# Patient Record
Sex: FEMALE | Race: WHITE | Employment: PART TIME | URBAN - METROPOLITAN AREA
[De-identification: names, ages, dates, MRNs, and addresses within clinical notes are randomized per-mention and may not be internally consistent; named-entity substitution may affect disease eponyms.]

---

## 2019-12-11 ENCOUNTER — THERAPY VISIT (OUTPATIENT)
Dept: PHYSICAL THERAPY | Facility: CLINIC | Age: 59
End: 2019-12-11
Payer: COMMERCIAL

## 2019-12-11 DIAGNOSIS — G89.29 CHRONIC LEFT SHOULDER PAIN: ICD-10-CM

## 2019-12-11 DIAGNOSIS — M25.512 CHRONIC LEFT SHOULDER PAIN: ICD-10-CM

## 2019-12-11 PROCEDURE — 97110 THERAPEUTIC EXERCISES: CPT | Mod: GP | Performed by: PHYSICAL THERAPIST

## 2019-12-11 PROCEDURE — 97112 NEUROMUSCULAR REEDUCATION: CPT | Mod: GP | Performed by: PHYSICAL THERAPIST

## 2019-12-11 PROCEDURE — 97161 PT EVAL LOW COMPLEX 20 MIN: CPT | Mod: GP | Performed by: PHYSICAL THERAPIST

## 2019-12-11 NOTE — LETTER
[facility]  [address]  [place] [address]  [phone]    2019    Re: Lynn Zepeda   :   1960  MRN:  [id number]   REFERRING PHYSICIAN:   Estefania Gilmore    [facility]    Date of Initial Evaluation:  2019  Visits:  Rxs Used: 1  Reason for Referral:  Chronic left shoulder pain    EVALUATION SUMMARY    Subjective:  The history is provided by the patient. No  was used.   Type of problem:  Left shoulder  Condition occurred with:  A fall. This is a chronic condition   Problem details: I was ice skating and falling on the right arm--I did it three time that day and landed on my left arm.  I had more of a wrist injury.  Later when I had to do some stretching in May and  I noticed I had some limitation of reaching and painful..   Patient reports pain:  Anterior, lateral and posterior. Radiates to:  Shoulder and upper arm. Associated with: none. Symptoms are exacerbated by using arm at shoulder level, using arm overhead, using arm behind back, carrying and lifting (reaching, 1day could barely move my arm ) and relieved by rest (tylenol).  Lynn Zepeda being seen for left shoulder pain.   Problem began 10/28/2019 (MD appointment). Where condition occurred: during recreation / sport.Problem occurred: unknown , possibly from falling on the ice while skating  and reported as 7/10 (with activity) on pain scale. General health as reported by patient is fair. Pertinent medical history includes:  Cancer, asthma, depression, menopausal, numbness/tingling and overweight. Other medical history details: mid back pain, ulcerative colitis, autoimmune liver disease.    Surgeries include:  Cancer surgery. Other surgery history details: removal of large instsetine, anus and rectum.  Current medications:  Anti-depressants. Other medications details:  medication for liver disease, antibotics, athma, .   Primary job tasks include:  Prolonged standing, repetitive tasks, operating a machine/assembly, pushing/pulling and lifting/carrying.  Pain is described as sharp and is intermittent. Pain is the same all the time.  Imaging testing: none. Previous treatment includes physical therapy (thumb, for other reason).    Patient is bakery,  pricing groceries, receiving. Restrictions include:  Working in normal job without restrictions (careful).  Home/work barriers: house, granddaughter 9.  Red flags:  None as reported by patient.                  Objective:  Standing Alignment:    Cervical/Thoracic:  Forward head (fair sitting posture)  Shoulder/UE:  Rounded shoulders (internal rotation of bilateral shoulders)  Re: Lynn Zepeda   :   1960      Lumbar:  Lordosis incr  Flexibility/Screens:   Positive screens:  ShoulderNegative screens: Cervical   Upper Extremity:    Decreased left upper extremity flexibility at:  Pectoralis Major and Pectoralis Minor  Decreased right upper extremity flexibility present at:  Pectoralis Major and Pectoralis Minor       Cervical/Thoracic Evaluation  Arom wnl cervical: cervical rotation tightness. flexion, extension WFL           Shoulder Evaluation:  ROM:  AROM:    Flexion:  Left:  WFL, painful arc, end range    Right:  WFL  Abduction:  Left: WFL, pain at end range   Right:  WFL  External Rotation:  Left:  80      Flexion/External Rotation:  Left:  Neck    Right:  Neck   Extension/Internal Rotation:  Left:  Lumbar with pain     Right:  Upper lumbar    Pain: opposite shoulder right WFL, left tightness   Endfeel: upper trapezius left,  4/5  right 4+/5, middle left 4-/5, right 4,4+/5   Stability Testing:    Left shoulder stability negative testing:  Sulcus sign  Right shoulder stability negative testing:  Sulcus sign  Special Tests:    Left shoulder positive for the following special tests:  Impingement and Acromioclavicular  Right  shoulder negative for the following special tests:Impingement  Palpation:    Left shoulder tenderness present at:  Acrimioclavicular; Supraspinatus; Subscapularis; Levator and Rhomboids  Mobility Tests:    Glenohumeral posterior left:  Hypomobile    Glenohumeral inferior left:  Hypomobile    Scapulothoracic left:  Hypermobile    Scapulohumeral rhythm right:  Hypermobile           Assessment/Plan:    Patient is a 59 year old female with left side shoulder complaints.    Patient has the following significant findings with corresponding treatment plan.                Diagnosis 1:  Left shoulder pain   Pain -  manual therapy, self management, education and home program  Decreased joint mobility - manual therapy, therapeutic exercise, therapeutic activity and home program  Decreased strength - therapeutic exercise, therapeutic activities and home program  Impaired muscle performance - neuro re-education and home program  Decreased function - therapeutic activities and home program  Impaired posture - neuro re-education, therapeutic activities and home program    Therapy Evaluation Codes:   Re: Lynn Zepeda   :   1960    Cumulative Therapy Evaluation is: Low complexity.    Previous and current functional limitations:  (See Goal Flow Sheet for this information)    Short term and Long term goals: (See Goal Flow Sheet for this information)     Communication ability:  Patient appears to be able to clearly communicate and understand verbal and written communication and follow directions correctly.  Treatment Explanation - The following has been discussed with the patient:   RX ordered/plan of care  This patient would benefit from PT intervention to resume normal activities.   Rehab potential is good.    Frequency:  1 X week, once daily  Duration:  for 8 weeks  Discharge Plan:  Achieve all LTG.  Independent in home treatment program.    Thank you for your referral.    INQUIRIES  Therapist: Keeley Nunez    [facility]  [address] [address]  Phone: [phone]  Fax: [phone]

## 2019-12-11 NOTE — PROGRESS NOTES
[facility] Initial Evaluation  Subjective:  The history is provided by the patient. No  was used.   Type of problem:  Left shoulder   Condition occurred with:  A fall. This is a chronic condition   Problem details: I was ice skating and falling on the right arm--I did it three time that day and landed on my left arm.  I had more of a wrist injury.  Later when I had to do some stretching in May and June I noticed I had some limitation of reaching and painful..   Patient reports pain:  Anterior, lateral and posterior. Radiates to:  Shoulder and upper arm. Associated with: none. Symptoms are exacerbated by using arm at shoulder level, using arm overhead, using arm behind back, carrying and lifting (reaching, 1day could barely move my arm ) and relieved by rest (tylenol).    Lynn HASMUKH Zepeda being seen for left shoulder pain.   Problem began 10/28/2019 (MD appointment). Where condition occurred: during recreation / sport.Problem occurred: unknown , possibly from falling on the ice while skating  and reported as 7/10 (with activity) on pain scale. General health as reported by patient is fair. Pertinent medical history includes:  Cancer, asthma, depression, menopausal, numbness/tingling and overweight. Other medical history details: mid back pain, ulcerative colitis, autoimmune liver disease.    Surgeries include:  Cancer surgery. Other surgery history details: removal of large instsetine, anus and rectum.  Current medications:  Anti-depressants. Other medications details: medication for liver disease, antibotics, athma, .   Primary job tasks include:  Prolonged standing, repetitive tasks, operating a machine/assembly, pushing/pulling and lifting/carrying.  Pain is described as sharp and is intermittent. Pain is the same all the time.  Imaging testing: none. Previous treatment includes physical therapy (thumb, for other reason).    Patient is bakery,  pricing groceries, receiving.  Restrictions include:  Working in normal job without restrictions (careful).    Home/work barriers: house, granddaughter 9.  Red flags:  None as reported by patient.                      Objective:  Standing Alignment:    Cervical/Thoracic:  Forward head (fair sitting posture)  Shoulder/UE:  Rounded shoulders (internal rotation of bilateral shoulders)  Lumbar:  Lordosis incr                Flexibility/Screens:   Positive screens:  ShoulderNegative screens: Cervical   Upper Extremity:    Decreased left upper extremity flexibility at:  Pectoralis Major and Pectoralis Minor    Decreased right upper extremity flexibility present at:  Pectoralis Major and Pectoralis Minor                      Cervical/Thoracic Evaluation  Arom wnl cervical: cervical rotation tightness. flexion, extension WFL                                   Shoulder Evaluation:  ROM:  AROM:    Flexion:  Left:  WFL, painful arc, end range    Right:  WFL    Abduction:  Left: WFL, pain at end range   Right:  WFL      External Rotation:  Left:  80              Flexion/External Rotation:  Left:  Neck    Right:  Neck   Extension/Internal Rotation:  Left:  Lumbar with pain     Right:  Upper lumbar      Pain: opposite shoulder right WFL, left tightness   Endfeel: upper trapezius left,  4/5  right 4+/5, middle left 4-/5, right 4,4+/5     Stability Testing:      Left shoulder stability negative testing:  Sulcus sign    Right shoulder stability negative testing:  Sulcus sign  Special Tests:    Left shoulder positive for the following special tests:  Impingement and Acromioclavicular    Right shoulder negative for the following special tests:Impingement  Palpation:    Left shoulder tenderness present at:  Acrimioclavicular; Supraspinatus; Subscapularis; Levator and Rhomboids    Mobility Tests:      Glenohumeral posterior left:  Hypomobile    Glenohumeral inferior left:  Hypomobile      Scapulothoracic left:  Hypermobile      Scapulohumeral rhythm right:   Hypermobile                                     General     ROS    Assessment/Plan:    Patient is a 59 year old female with left side shoulder complaints.    Patient has the following significant findings with corresponding treatment plan.                Diagnosis 1:  Left shoulder pain   Pain -  manual therapy, self management, education and home program  Decreased joint mobility - manual therapy, therapeutic exercise, therapeutic activity and home program  Decreased strength - therapeutic exercise, therapeutic activities and home program  Impaired muscle performance - neuro re-education and home program  Decreased function - therapeutic activities and home program  Impaired posture - neuro re-education, therapeutic activities and home program    Therapy Evaluation Codes:   Cumulative Therapy Evaluation is: Low complexity.    Previous and current functional limitations:  (See Goal Flow Sheet for this information)    Short term and Long term goals: (See Goal Flow Sheet for this information)     Communication ability:  Patient appears to be able to clearly communicate and understand verbal and written communication and follow directions correctly.  Treatment Explanation - The following has been discussed with the patient:   RX ordered/plan of care  This patient would benefit from PT intervention to resume normal activities.   Rehab potential is good.    Frequency:  1 X week, once daily  Duration:  for 8 weeks  Discharge Plan:  Achieve all LTG.  Independent in home treatment program.    Please refer to the daily flowsheet for treatment today, total treatment time and time spent performing 1:1 timed codes.

## 2019-12-12 PROBLEM — M25.512 CHRONIC LEFT SHOULDER PAIN: Status: ACTIVE | Noted: 2019-12-12

## 2019-12-12 PROBLEM — G89.29 CHRONIC LEFT SHOULDER PAIN: Status: ACTIVE | Noted: 2019-12-12

## 2019-12-18 ENCOUNTER — THERAPY VISIT (OUTPATIENT)
Dept: PHYSICAL THERAPY | Facility: CLINIC | Age: 59
End: 2019-12-18
Payer: COMMERCIAL

## 2019-12-18 DIAGNOSIS — G89.29 CHRONIC LEFT SHOULDER PAIN: ICD-10-CM

## 2019-12-18 DIAGNOSIS — M25.512 CHRONIC LEFT SHOULDER PAIN: ICD-10-CM

## 2019-12-18 PROCEDURE — 97110 THERAPEUTIC EXERCISES: CPT | Mod: GP | Performed by: PHYSICAL THERAPIST

## 2019-12-18 PROCEDURE — 97112 NEUROMUSCULAR REEDUCATION: CPT | Mod: GP | Performed by: PHYSICAL THERAPIST

## 2019-12-18 PROCEDURE — 97140 MANUAL THERAPY 1/> REGIONS: CPT | Mod: GP | Performed by: PHYSICAL THERAPIST

## 2019-12-26 ENCOUNTER — THERAPY VISIT (OUTPATIENT)
Dept: PHYSICAL THERAPY | Facility: CLINIC | Age: 59
End: 2019-12-26
Payer: COMMERCIAL

## 2019-12-26 DIAGNOSIS — G89.29 CHRONIC LEFT SHOULDER PAIN: ICD-10-CM

## 2019-12-26 DIAGNOSIS — M25.512 CHRONIC LEFT SHOULDER PAIN: ICD-10-CM

## 2019-12-26 PROCEDURE — 97110 THERAPEUTIC EXERCISES: CPT | Mod: GP | Performed by: PHYSICAL THERAPIST

## 2019-12-26 PROCEDURE — 97140 MANUAL THERAPY 1/> REGIONS: CPT | Mod: GP | Performed by: PHYSICAL THERAPIST

## 2019-12-26 PROCEDURE — 97112 NEUROMUSCULAR REEDUCATION: CPT | Mod: GP | Performed by: PHYSICAL THERAPIST

## 2019-12-26 NOTE — LETTER
[facility]  [address]  [place] [address]  [phone]    2019    Re: Lynn Zepeda   :   1960  MRN:  [id number]   REFERRING PHYSICIAN:   Estefania Gilmore    [facility] PHYS[facility]    Date of Initial Evaluation:  2019  Visits:  Rxs Used: 3  Reason for Referral:  Chronic left shoulder pain    PROGRESS  REPORT  Progress reporting period is from 2019 to 2019.       SUBJECTIVE  Subjective changes noted by patient:   I still get the pain when I am in different and odd position.  Overall I had about 3 episodes only last for a few seconds.      Current Pain level: 0/10. With certain activities 3-4/10.     Previous pain level was  0/10 , with certain activities 3-4/10 .   Changes in function:  None  Adverse reaction to treatment or activity: activity - overhead reaching below an object is when the pain occurs    OBJECTIVE  Changes noted in objective findings:  Yes, AROM flexion, abduction end range, hand behind the back to lumbar with pain same pain as overhead.  Strength IR/ER 5/5, biceps triceps 5/5, adduction 4/5, flexion, abduction 5/5.  Poor scapular position and cueing to maintain correct position.  Tightness in UT, levator, pectoralis major, minor and GH.  Discussed with patient progression of exercises and home exercise program.      ASSESSMENT/PLAN  Updated problem list and treatment plan: Diagnosis 1:  Left shoulder pain  Pain -  manual therapy, self management, education and home program  Decreased ROM/flexibility - manual therapy, therapeutic exercise, therapeutic activity and home program  Decreased joint mobility - manual therapy, therapeutic exercise, therapeutic activity and home program  Decreased strength - therapeutic exercise, therapeutic activities and home program  Impaired muscle performance - neuro re-education and home  program  Decreased function - therapeutic activities and home program  STG/LTGs have been met or progress has been made towards goals:  None  Assessment of Progress: The patient's condition has potential to improve.  Self Management Plans:  Patient has been instructed in a home treatment program.  Patient  has been instructed in self management of symptoms.      Re: Lynn Zepeda   :   1960      Recommendations:  Since pain is so episodic, patient to try exercises on her own.  Discussed with patient progression of exercises and the need for correct scapular position.  Patient has appointments left on original order and may return if pain increases or if unable to progress with exercises.    Thank you for your referral.      INQUIRIES  Therapist: Keeley Nunez PT  [facility]  [address] [address]  Phone: [phone]  Fax: [phone]

## 2019-12-27 NOTE — PROGRESS NOTES
Subjective:  HPI                    Objective:  System    Physical Exam    General     ROS    Assessment/Plan:    PROGRESS  REPORT    Progress reporting period is from 12/11/2019  to 12/16/2019.       SUBJECTIVE  Subjective changes noted by patient:   I still get the pain when I am in different and odd position.  Overall I had about 3 episodes only last for a few seconds.       Current Pain level: 0/10. With certain activities 3-4/10.     Previous pain level was  0/10 , with certain activities 3-4/10 .   Changes in function:  None  Adverse reaction to treatment or activity: activity - overhead reaching below an object is when the pain occurs    OBJECTIVE  Changes noted in objective findings:  Yes, AROM flexion, abduction end range, hand behind the back to lumbar with pain same pain as overhead.  Strength IR/ER 5/5, biceps triceps 5/5, adduction 4/5, flexion, abduction 5/5.  Poor scapular position and cueing to maintain correct position.  Tightness in UT, levator, pectoralis major, minor and GH.  Discussed with patient progression of exercises and home exercise program.        ASSESSMENT/PLAN  Updated problem list and treatment plan: Diagnosis 1:  Left shoulder pain  Pain -  manual therapy, self management, education and home program  Decreased ROM/flexibility - manual therapy, therapeutic exercise, therapeutic activity and home program  Decreased joint mobility - manual therapy, therapeutic exercise, therapeutic activity and home program  Decreased strength - therapeutic exercise, therapeutic activities and home program  Impaired muscle performance - neuro re-education and home program  Decreased function - therapeutic activities and home program  STG/LTGs have been met or progress has been made towards goals:  None  Assessment of Progress: The patient's condition has potential to improve.  Self Management Plans:  Patient has been instructed in a home treatment program.  Patient  has been instructed in self  management of symptoms.      Recommendations:  Since pain is so episodic, patient to try exercises on her own.  Discussed with patient progression of exercises and the need for correct scapular position.  Patient has appointments left on original order and may return if pain increases or if unable to progress with exercises.    Please refer to the daily flowsheet for treatment today, total treatment time and time spent performing 1:1 timed codes.

## 2020-12-14 PROBLEM — G89.29 CHRONIC LEFT SHOULDER PAIN: Status: RESOLVED | Noted: 2019-12-12 | Resolved: 2020-12-14

## 2020-12-14 PROBLEM — M25.512 CHRONIC LEFT SHOULDER PAIN: Status: RESOLVED | Noted: 2019-12-12 | Resolved: 2020-12-14

## 2020-12-14 NOTE — PROGRESS NOTES
Patient did not return for further treatment and no additional progress was noted.  Please refer to the progress note and goal flowsheet completed on 12/26/19 for discharge information.